# Patient Record
Sex: FEMALE | Race: WHITE | Employment: UNEMPLOYED | URBAN - METROPOLITAN AREA
[De-identification: names, ages, dates, MRNs, and addresses within clinical notes are randomized per-mention and may not be internally consistent; named-entity substitution may affect disease eponyms.]

---

## 2018-01-10 ENCOUNTER — OFFICE VISIT (OUTPATIENT)
Dept: SURGERY | Facility: CLINIC | Age: 33
End: 2018-01-10
Payer: COMMERCIAL

## 2018-01-10 VITALS
SYSTOLIC BLOOD PRESSURE: 101 MMHG | DIASTOLIC BLOOD PRESSURE: 55 MMHG | WEIGHT: 146.6 LBS | HEART RATE: 50 BPM | HEIGHT: 63 IN | OXYGEN SATURATION: 99 % | TEMPERATURE: 97.6 F | BODY MASS INDEX: 25.98 KG/M2

## 2018-01-10 DIAGNOSIS — R10.84 ABDOMINAL PAIN, GENERALIZED: Primary | ICD-10-CM

## 2018-01-10 ASSESSMENT — ENCOUNTER SYMPTOMS
NAUSEA: 1
HALLUCINATIONS: 0
DIARRHEA: 0
ALTERED TEMPERATURE REGULATION: 1
POLYPHAGIA: 0
FATIGUE: 1
INCREASED ENERGY: 1
NIGHT SWEATS: 1
VOMITING: 0
BLOATING: 1
BOWEL INCONTINENCE: 0
CHILLS: 1
BLOOD IN STOOL: 0
WEIGHT LOSS: 0
FEVER: 0
WEIGHT GAIN: 0
POLYDIPSIA: 1
RECTAL PAIN: 0
DECREASED APPETITE: 0
ABDOMINAL PAIN: 1
JAUNDICE: 0
CONSTIPATION: 0
HEARTBURN: 0

## 2018-01-10 ASSESSMENT — PAIN SCALES - GENERAL: PAINLEVEL: MILD PAIN (3)

## 2018-01-10 NOTE — MR AVS SNAPSHOT
After Visit Summary   1/10/2018    Tricia Nunez    MRN: [id number]           Patient Information     Date Of Birth          1985        Visit Information        Provider Department      1/10/2018 9:45 AM Araceli Jewell MD [facility] General Surgery        Today's Diagnoses     Abdominal pain, generalized    -  1       Follow-ups after your visit        Additional Services     PAIN MANAGEMENT REFERRAL       Your provider has referred you to: [facility]: [facility] [place] [phone] [email or web address]      Please call [phone] to make an appointment. Clinic is located: [facility] [address] [place] [address] [id number] 4th [place] [address]      Please complete the following questions:    Procedure/Referral: Referral Only -  Comprehensive Evaluation and Management    What is your diagnosis for the patient's pain? Chronic abdominal pain of uncertain etiology    What are your specific questions for the pain specialist? Patient needs pain control and management    Are there any red flags that may impact the assessment or management of the patient? Other: Patient has mental illness/bipolar disorder diagnosis in computer which she believe she got when she was in the hospital for methamphetamines about 5-6 years ago.  She is not taking any medications for this and denies current meth use.      Please note the Pre-Op Pain Consult must be scheduled 2-3 weeks prior to the patient's surgery.  Patient's trying to schedule within 2 weeks of surgery may not be accommodated.     Pre-Op Pain Consults are only good for 30 days.    **ANY DIAGNOSTIC TESTS THAT ARE NOT IN EPIC SHOULD BE SENT TO THE PAIN CENTER**    REGARDING OPIOID MEDICATIONS:  We will always address appropriateness of opioid pain medications, but we generally will not automatically take on a prescribing role. When we  do take on prescribing of opioids for chronic pain, it is in collaboration with the referring physician for an intermediate period of time (months), with an expectation that the primary physician or provider will assume the prescribing role if medications are effective at stable doses with demonstrated compliance.  Therefore, please do not assume that your prescribing responsibilities end on the day of pain clinic consultation.  Is this agreeable to you? I don't currently prescribe for her.  Please contact PCP to partner for prescriptions.    Please be aware that coverage of these services is subject to the terms and limitations of your health insurance plan.  Call member services at your health plan with any benefit or coverage questions.      Please bring the following with you to your appointment:    (1) Any X-Rays, CTs or MRIs which have been performed.  Contact the facility where they were done to arrange for  prior to your scheduled appointment.    (2) List of current medications   (3) This referral request   (4) Any documents/labs given to you for this referral                  Who to contact     Please call your clinic at [phone] to:    Ask questions about your health    Make or cancel appointments    Discuss your medicines    Learn about your test results    Speak to your doctor   If you have compliments or concerns about an experience at your clinic, or if you wish to file a complaint, please contact [facility] Patient Relations at [phone] or email us at [email or web address]         Additional Information About Your Visit        StyleTechhart Information     [employer] is an electronic gateway that provides easy, online access to your medical records. With [employer], you can request a clinic appointment, read your test results, renew a prescription or communicate with your care team.     To sign up for Wandou[employer] visit the website at [email or web address]  "  You will be asked to enter the access code listed below, as well as some personal information. Please follow the directions to create your username and password.     Your access code is: [email or web address]  Expires: 2018  6:30 AM     Your access code will  in 90 days. If you need help or a new code, please contact your [facility] or call [phone] for assistance.        Care EveryWhere ID     This is your Care EveryWhere ID. This could be used by other organizations to access your [place] medical records  [id number]        Your Vitals Were     Pulse Temperature Height Pulse Oximetry BMI (Body Mass Index)       50 97.6  F (36.4  C) (Oral) 5' 3\" 99% 25.97 kg/m2        Blood Pressure from Last 3 Encounters:   01/10/18 101/55   09/04/15 (!) 88/49   08/26/15 107/67    Weight from Last 3 Encounters:   01/10/18 146 lb 9.6 oz   09/04/15 145 lb 9.6 oz   08/26/15 141 lb 14.4 oz              We Performed the Following     PAIN MANAGEMENT REFERRAL        Primary Care Provider Office Phone # Fax #    Emma [place] [phone]       [facility] [id number] [place] [id number]        Equal Access to Services     FRANCISCO JAVIER MCCLOUD AH: Hadii aad ku hadasho Soomaali, waaxda luqadaha, qaybta kaalmada adeegyada, waxay idiin hayaan alejandro rice . So [facility] [phone].    ATENCIÓN: Si habla español, tiene a brunson disposición servicios gratuitos de asistencia lingüística. Llame al [phone].    We comply with applicable federal civil rights laws and [place] laws. We do not discriminate on the basis of race, color, national origin, age, disability, sex, sexual orientation, or gender identity.            Thank you!     Thank you for choosing [facility]  for your care. Our goal is always to provide you with excellent care. Hearing back from our patients is one way we can continue to improve our services. Please take a few minutes to " complete the written survey that you may receive in the mail after your visit with us. Thank you!             Your Updated Medication List - Protect others around you: Learn how to safely use, store and throw away your medicines at www.disposemymeds.org.          This list is accurate as of: 1/10/18 10:40 AM.  Always use your most recent med list.                   Brand Name Dispense Instructions for use Diagnosis    ADVAIR DISKUS 100-50 MCG/DOSE diskus inhaler   Generic drug:  fluticasone-salmeterol           * PROAIR  (90 BASE) MCG/ACT Inhaler   Generic drug:  albuterol           * albuterol (2.5 MG/3ML) 0.083% neb solution           dicyclomine 20 MG tablet    BENTYL          fluticasone 50 MCG/ACT spray    FLONASE          * Notice:  This list has 2 medication(s) that are the same as other medications prescribed for you. Read the directions carefully, and ask your doctor or other care provider to review them with you.

## 2018-01-10 NOTE — PROGRESS NOTES
"Surgery Clinic Outpatient Progress Note  January 10, 2018  Tricia Nunez  [id number]    S: Tricia Nunez is a 32 year old female who presents to the clinic in follow-up of chronic abdominal pain. The pain has been present now for about seven years.  She says this is getting worse.  She says it is pretty much all day long.  She underwent hysterectomy in June to try to make this better, but it did not make any difference, better or worse.  She denies any alleviating or exacerbating factors such as eating, movement or sleep.  The patient is eating \"good enough\" and she denies any bowel complaints such as nausea, constipation or diarrhea.     ROS: Answers for HPI/ROS submitted by the patient on 1/10/2018   General Symptoms: Yes  Skin Symptoms: No  HENT Symptoms: No  EYE SYMPTOMS: No  HEART SYMPTOMS: No  LUNG SYMPTOMS: No  INTESTINAL SYMPTOMS: Yes  URINARY SYMPTOMS: No  GYNECOLOGIC SYMPTOMS: No  BREAST SYMPTOMS: No  SKELETAL SYMPTOMS: No  BLOOD SYMPTOMS: No  NERVOUS SYSTEM SYMPTOMS: No  MENTAL HEALTH SYMPTOMS: No  Fever: No  Loss of appetite: No  Weight loss: No  Weight gain: No  Fatigue: Yes  Night sweats: Yes  Chills: Yes  Increased stress: No  Excessive hunger: No  Excessive thirst: Yes  Feeling hot or cold when others believe the temperature is normal: Yes  Loss of height: No  Post-operative complications: No  Surgical site pain: No  Hallucinations: No  Change in or Loss of Energy: Yes  Hyperactivity: No  Confusion: No  Heart burn or indigestion: No  Nausea: Yes  Vomiting: No  Abdominal pain: Yes  Bloating: Yes  Constipation: No  Diarrhea: No  Blood in stool: No  Black stools: No  Rectal or Anal pain: No  Fecal incontinence: No  Yellowing of skin or eyes: No  Vomit with blood: No  Change in stools: No    O:  146 lbs 9.6 oz   Temp:  [97.6  F (36.4  C)] 97.6  F (36.4  C)  Pulse:  [50] 50  BP: (101)/(55) 101/55  SpO2:  [99 %] 99 %  Drain: The patient does not have any drains or tubes we are monitoring.  Physical Exam:  Gen: " "Alert, oriented, no distress  Psych: Normal affect  Neuro: No focal deficit  Resp: Quiet breathing  CV: Warm and well perfused.  Abd: Soft mild epigastric tenderness  Ext: No obvious deformities    A/P:   Tricia Nunez is a 32 year old female with chronic abdominal pain.  Previous work-up including CT and CT enterography have shown no surgical pathology.  I recommended referral to GI for possible increase in omeprazole and adding a second agent.  She mentioned that she had tried everything and at this point interrupts to ask if I would \"start a pain contract with her.\"  I think she is looking for medical pain management with narcotics .  I will refer her to the pain clinic, but I think they will prefer to work with a PCP in her hometown as well.  At this point she tells me that her PCP won't give her narcotics and they wanted to start her on suboxone.     Total time 25 mins, the majority of which was spent in counseling.  Araceli Jewell MD FACS  Associate Professor of Surgery  Pager [phone]         "

## 2018-01-10 NOTE — NURSING NOTE
"Chief Complaint   Patient presents with     Clinic Care Coordination - Follow-up     return       Vitals:    01/10/18 0925   BP: 101/55   Pulse: 50   Temp: 97.6  F (36.4  C)   TempSrc: Oral   SpO2: 99%   Weight: 146 lb 9.6 oz   Height: 5' 3\"       Body mass index is 25.97 kg/(m^2).    Vera JHAVERI LPN                       "

## 2018-01-10 NOTE — LETTER
"1/10/2018       RE: Tricia Nunez  [address] [place] [address]     Dear Colleague,    Thank you for referring your patient, Tricia Nunez, to the [facility] GENERAL SURGERY at [facility]. Please see a copy of my visit note below.    Surgery Clinic Outpatient Progress Note  January 10, 2018  Tricia Nunez  [id number]    S: Tricia Nunez is a 32 year old female who presents to the clinic in follow-up of chronic abdominal pain. The pain has been present now for about seven years.  She says this is getting worse.  She says it is pretty much all day long.  She underwent hysterectomy in June to try to make this better, but it did not make any difference, better or worse.  She denies any alleviating or exacerbating factors such as eating, movement or sleep.  The patient is eating \"good enough\" and she denies any bowel complaints such as nausea, constipation or diarrhea.     ROS:    O:  146 lbs 9.6 oz   Temp:  [97.6  F (36.4  C)] 97.6  F (36.4  C)  Pulse:  [50] 50  BP: (101)/(55) 101/55  SpO2:  [99 %] 99 %  Drain: The patient does not have any drains or tubes we are monitoring.  Physical Exam:  Gen: Alert, oriented, no distress  Psych: Normal affect  Neuro: No focal deficit  Resp: Quiet breathing  CV: Warm and well perfused.  Abd: Soft mild epigastric tenderness  Ext: No obvious deformities    A/P:   Tricia Nunez is a 32 year old female with chronic abdominal pain.  Previous work-up including CT and CT enterography have shown no surgical pathology.  I recommended referral to GI for possible increase in omeprazole and adding a second agent.  She mentioned that she had tried everything and at this point interrupts to ask if I would \"start a pain contract with her.\"  I think she is looking for medical pain management with narcotics .  I will refer her to the pain clinic, but I think they will prefer to work with a PCP in her hometown as well.  At this point she tells me that her PCP won't give her " narcotics and they wanted to start her on suboxone.     Total time 25 mins, the majority of which was spent in counseling.  Araceli Jewell MD FACS  Associate Professor of Surgery  Pager [phone]           Again, thank you for allowing me to participate in the care of your patient.      Sincerely,    Araceli Jewell MD

## 2018-01-11 NOTE — TELEPHONE ENCOUNTER
APPT INFO    Date /Time: 1/16/18 9:40AM    Reason for Appt: new abdominal pain   Ref Provider/Clinic: [facility]   Are there internal records? Yes/No?  IF YES, list clinic names: [facility] General Surgery [facility] (referring) / Images in PACS   Colonoscopy 9/12/14 & Upper GI 9/12/14   Are there outside records? Yes/No? Yes    Patient Contact (Y/N) & Call Details: No, pt was referred.      Action: CareEverywhere records reviewed. See CareEverywhere Tab.  Faxed cover sheet to [facility] to re. Images pushed      OUTSIDE RECORDS CHECKLIST     CLINIC NAME COMMENTS REC (x) IMG (x)   [facility] (Care-Everywhere) OFFICE NOTES: 10/20/17, 10/4/17, 9/21/17, 9/8/17, 8/1/17, 5/17/17  RADIOLOGY:   CT abd pelvis 8/27/17, 7/27/17, 5/18/17   US Pelvis 3/3/17  ER/HOSP: 9/17/17, 8/27/17, 7/27/17   OP NOTES: TOTAL LAPAROSCOPIC HYSTERECTOMY; BILATERAL SALPINGOOPHORECTOMY 7/24/17  OTHER:  5/18/17  X

## 2018-01-15 NOTE — TELEPHONE ENCOUNTER
Phone Call:    Who did you talk to? (or) Who did you call? Chance Antoine, spoke with Kate Nath Detail/Action: Kate states pt images were not done at [facility], they were done at [facility].     Faxed cover sheet to [facility] to mail CD ASAP

## 2018-01-16 ENCOUNTER — PRE VISIT (OUTPATIENT)
Dept: ANESTHESIOLOGY | Facility: CLINIC | Age: 33
End: 2018-01-16

## 2018-01-16 NOTE — TELEPHONE ENCOUNTER
Phone Call:    Who did you talk to? (or) Who did you call? [facility] called and LM    Call Detail/Action: They will mail out CD since it is passed due to overnight their CD. I will send the CD to the Film room once I receive it.

## 2018-01-16 NOTE — TELEPHONE ENCOUNTER
[facility]    Image Type (x): Disc:_x__  Pacs:___      Exam Date/Name: CT Abd/Pelvis 8/27/17, 7/27/17, 5/18/17    US Pelvis 3/3/17 Comments: Disc sent to [facility]

## 2018-02-16 ENCOUNTER — RADIANT APPOINTMENT (OUTPATIENT)
Dept: GENERAL RADIOLOGY | Facility: CLINIC | Age: 33
End: 2018-02-16
Attending: PHYSICIAN ASSISTANT
Payer: COMMERCIAL

## 2018-02-16 ENCOUNTER — OFFICE VISIT (OUTPATIENT)
Dept: GASTROENTEROLOGY | Facility: CLINIC | Age: 33
End: 2018-02-16
Payer: COMMERCIAL

## 2018-02-16 VITALS
BODY MASS INDEX: 25.76 KG/M2 | TEMPERATURE: 97.6 F | OXYGEN SATURATION: 100 % | WEIGHT: 145.4 LBS | DIASTOLIC BLOOD PRESSURE: 74 MMHG | HEART RATE: 56 BPM | SYSTOLIC BLOOD PRESSURE: 114 MMHG | HEIGHT: 63 IN

## 2018-02-16 DIAGNOSIS — R10.84 ABDOMINAL PAIN, GENERALIZED: ICD-10-CM

## 2018-02-16 DIAGNOSIS — R10.84 ABDOMINAL PAIN, GENERALIZED: Primary | ICD-10-CM

## 2018-02-16 LAB
ALBUMIN SERPL-MCNC: 4.1 G/DL (ref 3.4–5)
ALP SERPL-CCNC: 98 U/L (ref 40–150)
ALT SERPL W P-5'-P-CCNC: 17 U/L (ref 0–50)
ANION GAP SERPL CALCULATED.3IONS-SCNC: 5 MMOL/L (ref 3–14)
AST SERPL W P-5'-P-CCNC: 15 U/L (ref 0–45)
BASOPHILS # BLD AUTO: 0.1 10E9/L (ref 0–0.2)
BASOPHILS NFR BLD AUTO: 0.8 %
BILIRUB DIRECT SERPL-MCNC: 0.2 MG/DL (ref 0–0.2)
BILIRUB SERPL-MCNC: 0.6 MG/DL (ref 0.2–1.3)
BUN SERPL-MCNC: 10 MG/DL (ref 7–30)
CALCIUM SERPL-MCNC: 9.3 MG/DL (ref 8.5–10.1)
CHLORIDE SERPL-SCNC: 102 MMOL/L (ref 94–109)
CO2 SERPL-SCNC: 31 MMOL/L (ref 20–32)
CREAT SERPL-MCNC: 0.71 MG/DL (ref 0.52–1.04)
CRP SERPL-MCNC: <2.9 MG/L (ref 0–8)
DIFFERENTIAL METHOD BLD: NORMAL
EOSINOPHIL # BLD AUTO: 0.5 10E9/L (ref 0–0.7)
EOSINOPHIL NFR BLD AUTO: 8.2 %
ERYTHROCYTE [DISTWIDTH] IN BLOOD BY AUTOMATED COUNT: 11.7 % (ref 10–15)
ERYTHROCYTE [SEDIMENTATION RATE] IN BLOOD BY WESTERGREN METHOD: 5 MM/H (ref 0–20)
GFR SERPL CREATININE-BSD FRML MDRD: >90 ML/MIN/1.7M2
GLUCOSE SERPL-MCNC: 88 MG/DL (ref 70–99)
HCT VFR BLD AUTO: 42.5 % (ref 35–47)
HGB BLD-MCNC: 14 G/DL (ref 11.7–15.7)
IMM GRANULOCYTES # BLD: 0 10E9/L (ref 0–0.4)
IMM GRANULOCYTES NFR BLD: 0.2 %
LYMPHOCYTES # BLD AUTO: 2.3 10E9/L (ref 0.8–5.3)
LYMPHOCYTES NFR BLD AUTO: 37.8 %
MCH RBC QN AUTO: 31.3 PG (ref 26.5–33)
MCHC RBC AUTO-ENTMCNC: 32.9 G/DL (ref 31.5–36.5)
MCV RBC AUTO: 95 FL (ref 78–100)
MONOCYTES # BLD AUTO: 0.4 10E9/L (ref 0–1.3)
MONOCYTES NFR BLD AUTO: 7.2 %
NEUTROPHILS # BLD AUTO: 2.8 10E9/L (ref 1.6–8.3)
NEUTROPHILS NFR BLD AUTO: 45.8 %
NRBC # BLD AUTO: 0 10*3/UL
NRBC BLD AUTO-RTO: 0 /100
PLATELET # BLD AUTO: 267 10E9/L (ref 150–450)
POTASSIUM SERPL-SCNC: 3.5 MMOL/L (ref 3.4–5.3)
PROT SERPL-MCNC: 7.4 G/DL (ref 6.8–8.8)
RBC # BLD AUTO: 4.47 10E12/L (ref 3.8–5.2)
SODIUM SERPL-SCNC: 138 MMOL/L (ref 133–144)
WBC # BLD AUTO: 6 10E9/L (ref 4–11)

## 2018-02-16 RX ORDER — FLUOXETINE 40 MG/1
40 CAPSULE ORAL
COMMUNITY
Start: 2018-02-06 | End: 2018-11-05

## 2018-02-16 RX ORDER — DOXEPIN HYDROCHLORIDE 25 MG/1
25 CAPSULE ORAL
COMMUNITY
Start: 2018-02-06

## 2018-02-16 RX ORDER — BUSPIRONE HYDROCHLORIDE 30 MG/1
30 TABLET ORAL
COMMUNITY
Start: 2018-02-06 | End: 2018-11-05

## 2018-02-16 RX ORDER — ROPINIROLE 0.5 MG/1
0.5 TABLET, FILM COATED ORAL
COMMUNITY
Start: 2018-02-06

## 2018-02-16 ASSESSMENT — PAIN SCALES - GENERAL: PAINLEVEL: MILD PAIN (2)

## 2018-02-16 NOTE — NURSING NOTE
"Chief Complaint   Patient presents with     RECHECK     F/U       Vitals:    02/16/18 0807   BP: 114/74   BP Location: Left arm   Patient Position: Chair   Cuff Size: Adult Regular   Pulse: 56   Temp: 97.6  F (36.4  C)   SpO2: 100%   Weight: 145 lb 6.4 oz   Height: 5' 3\"       Body mass index is 25.76 kg/(m^2).      Odessa Reed                          "

## 2018-02-16 NOTE — PATIENT INSTRUCTIONS
It was a pleasure taking care of you today.  I've included a brief summary of our discussion and care plan from today's visit below.  Please review this information with your primary care provider.  ______________________________________________________________________    My recommendations are summarized as follows:    -- Labs today  -- Stool sample when able  -- Recommend a trial of Miralax.  This is not a stimulant laxative and is safe to take on a daily basis.  Try 2 cap(s) every day.  You can titrate this dose (increase or decrease) based on stool frequency and consistency.  -- Recommend soluble fiber supplementation on a daily basis (Metamucil, citrucel or benefiber).  Start with 1 tablespoon per day and if tolerated, may increase up to 2-3 tablespoons per day.  You may experience some bloating with initiation of fiber supplementation that will improve over the first month.  A good fiber trial to evaluate the effect is 3-6 months.     ______________________________________________________________________    Who do I call with any questions after my visit?  Please be in touch if there are any further questions that arise following today's visit.  There are multiple ways to contact your gastroenterology care team.        During business hours, you may reach a Gastroenterology nurse at [phone], option 3.       To schedule or reschedule an appointment, please call [phone].       You can always send a secure message through [employer].  [employer] messages are answered by your nurse or doctor typically within 24 hours.  Please allow extra time on weekends and holidays.        For urgent/emergent questions after business hours, you may reach the on-call GI Fellow by contacting the [facility] at [phone].     How will I get the results of any tests ordered?    You will receive all of your results.  If you have signed up for MyChart, any tests ordered at your visit will be available to you  after your physician reviews them.  Typically this takes 1-2 weeks.  If there are urgent results that require a change in your care plan, your physician or nurse will call you to discuss the next steps.      What is [employer]iv[employer]?  [employer] is a secure way for you to access all of your healthcare records from the [facility].  It is a web based computer program, so you can sign on to it from any location.  It also allows you to send secure messages to your care team.  I recommend signing up for [employer] access if you have not already done so and are comfortable with using a computer.      How to I schedule a follow-up visit?  If you did not schedule a follow-up visit today, please call [phone] to schedule a follow-up office visit.        Sincerely,    Jose Alejandro Beltran PA-C  [facility]  Division of Gastroenterology
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
## 2018-11-05 ENCOUNTER — OFFICE VISIT (OUTPATIENT)
Dept: SURGERY | Facility: CLINIC | Age: 33
End: 2018-11-05
Payer: COMMERCIAL

## 2018-11-05 VITALS
DIASTOLIC BLOOD PRESSURE: 63 MMHG | HEART RATE: 84 BPM | HEIGHT: 63 IN | TEMPERATURE: 97.5 F | SYSTOLIC BLOOD PRESSURE: 100 MMHG | WEIGHT: 157 LBS | BODY MASS INDEX: 27.82 KG/M2

## 2018-11-05 DIAGNOSIS — R10.84 GENERALIZED ABDOMINAL PAIN: Primary | ICD-10-CM

## 2018-11-05 PROCEDURE — 99204 OFFICE O/P NEW MOD 45 MIN: CPT | Performed by: SURGERY

## 2018-11-05 ASSESSMENT — PAIN SCALES - GENERAL: PAINLEVEL: MODERATE PAIN (4)
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
## 2018-11-05 NOTE — NURSING NOTE
"Initial /63 (BP Location: Right arm, Patient Position: Sitting, Cuff Size: Adult Regular)  Pulse 84  Temp 97.5  F (36.4  C) (Tympanic)  Ht 1.6 m (5' 3\")  Wt 71.2 kg (157 lb)  BMI 27.81 kg/m2 Estimated body mass index is 27.81 kg/(m^2) as calculated from the following:    Height as of this encounter: 1.6 m (5' 3\").    Weight as of this encounter: 71.2 kg (157 lb). .    Elo Cardenas CMA    "
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
## 2022-01-10 ENCOUNTER — HOSPITAL ENCOUNTER (EMERGENCY)
Facility: CLINIC | Age: 37
Discharge: HOME | End: 2022-01-10
Attending: PHYSICIAN ASSISTANT | Admitting: PHYSICIAN ASSISTANT
Payer: MEDICAID

## 2022-01-10 ENCOUNTER — NURSE TRIAGE (OUTPATIENT)
Dept: NURSING | Facility: CLINIC | Age: 37
End: 2022-01-10
Payer: MEDICAID

## 2022-01-10 VITALS
HEART RATE: 102 BPM | WEIGHT: 140 LBS | HEIGHT: 63 IN | RESPIRATION RATE: 18 BRPM | BODY MASS INDEX: 24.8 KG/M2 | SYSTOLIC BLOOD PRESSURE: 113 MMHG | TEMPERATURE: 97.4 F | DIASTOLIC BLOOD PRESSURE: 67 MMHG | OXYGEN SATURATION: 97 %

## 2022-01-10 DIAGNOSIS — M46.1 SACROILIITIS (H): ICD-10-CM

## 2022-01-10 DIAGNOSIS — G89.29 CHRONIC BILATERAL LOW BACK PAIN WITH BILATERAL SCIATICA: ICD-10-CM

## 2022-01-10 DIAGNOSIS — M54.41 CHRONIC BILATERAL LOW BACK PAIN WITH BILATERAL SCIATICA: ICD-10-CM

## 2022-01-10 DIAGNOSIS — M54.42 CHRONIC BILATERAL LOW BACK PAIN WITH BILATERAL SCIATICA: ICD-10-CM

## 2022-01-10 PROBLEM — R10.33 PERIUMBILICAL ABDOMINAL PAIN: Status: ACTIVE | Noted: 2018-10-23

## 2022-01-10 PROBLEM — R10.2 PELVIC PAIN: Status: ACTIVE | Noted: 2022-01-10

## 2022-01-10 PROBLEM — R07.89 ATYPICAL CHEST PAIN: Status: ACTIVE | Noted: 2019-03-21

## 2022-01-10 PROBLEM — R53.82 CHRONIC FATIGUE: Status: ACTIVE | Noted: 2018-01-25

## 2022-01-10 PROBLEM — F51.04 PSYCHOPHYSIOLOGICAL INSOMNIA: Status: ACTIVE | Noted: 2020-02-20

## 2022-01-10 PROBLEM — R00.1 BRADYCARDIA: Status: ACTIVE | Noted: 2019-03-21

## 2022-01-10 PROBLEM — J45.40 MODERATE PERSISTENT ASTHMA: Status: ACTIVE | Noted: 2018-02-06

## 2022-01-10 PROBLEM — Z86.19 HISTORY OF HEPATITIS C: Status: ACTIVE | Noted: 2018-01-25

## 2022-01-10 PROCEDURE — 96372 THER/PROPH/DIAG INJ SC/IM: CPT | Performed by: PHYSICIAN ASSISTANT

## 2022-01-10 PROCEDURE — 99204 OFFICE O/P NEW MOD 45 MIN: CPT | Performed by: PHYSICIAN ASSISTANT

## 2022-01-10 PROCEDURE — 250N000011 HC RX IP 250 OP 636: Performed by: PHYSICIAN ASSISTANT

## 2022-01-10 PROCEDURE — G0463 HOSPITAL OUTPT CLINIC VISIT: HCPCS | Performed by: PHYSICIAN ASSISTANT

## 2022-01-10 RX ORDER — HYDROMORPHONE HYDROCHLORIDE 1 MG/ML
0.5 INJECTION, SOLUTION INTRAMUSCULAR; INTRAVENOUS; SUBCUTANEOUS
Status: DISCONTINUED | OUTPATIENT
Start: 2022-01-10 | End: 2022-01-10

## 2022-01-10 RX ORDER — HYDROMORPHONE HYDROCHLORIDE 1 MG/ML
0.5 INJECTION, SOLUTION INTRAMUSCULAR; INTRAVENOUS; SUBCUTANEOUS
Status: COMPLETED | OUTPATIENT
Start: 2022-01-10 | End: 2022-01-10

## 2022-01-10 RX ORDER — KETOROLAC TROMETHAMINE 30 MG/ML
30 INJECTION, SOLUTION INTRAMUSCULAR; INTRAVENOUS ONCE
Status: COMPLETED | OUTPATIENT
Start: 2022-01-10 | End: 2022-01-10

## 2022-01-10 RX ADMIN — KETOROLAC TROMETHAMINE 30 MG: 30 INJECTION, SOLUTION INTRAMUSCULAR; INTRAVENOUS at 15:56

## 2022-01-10 RX ADMIN — HYDROMORPHONE HYDROCHLORIDE 0.5 MG: 1 INJECTION, SOLUTION INTRAMUSCULAR; INTRAVENOUS; SUBCUTANEOUS at 16:07

## 2022-01-10 ASSESSMENT — ENCOUNTER SYMPTOMS
BACK PAIN: 1
CONSTITUTIONAL NEGATIVE: 1
NEUROLOGICAL NEGATIVE: 1
GASTROINTESTINAL NEGATIVE: 1

## 2022-01-10 ASSESSMENT — MIFFLIN-ST. JEOR: SCORE: 1294.17
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
## 2022-01-10 NOTE — TELEPHONE ENCOUNTER
Patient calling today stating she's been in excruciating pain since last night. Patient has and SI joint fusion in July and other side 7 weeks ago and is rating her lower back pain 9-10/10.    Patient has taken the maximum amount of Tylenol and Aleve, using heat and Ice, and resting with no relief.     Patient states her stomach is upset from all the pain medication. Advised patient take medication with food.    Per protocol, recommendations are for patient to See Today in Office. Advised urgent care. Care advice given. Advised patient to call back if they develop any new or worsening symptoms. Patient verbalizes understanding and agrees with plan of care.    Chasity Duke RN  01/10/22 2:04 PM  [facility] Nurse Advisor    Reason for Disposition    SEVERE back pain (e.g., excruciating, unable to do any normal activities) and not improved after pain medicine and CARE ADVICE    Additional Information    Negative: Passed out (i.e., fainted, collapsed and was not responding)    Negative: Shock suspected (e.g., cold/pale/clammy skin, too weak to stand, low BP, rapid pulse)    Negative: Sounds like a life-threatening emergency to the triager    Negative: Major injury to the back (e.g., MVA, fall > 10 feet or 3 meters, penetrating injury, etc.)    Negative: Pain in the upper back over the ribs (rib cage) that radiates (travels) into the chest    Negative: Pain in the upper back over the ribs (rib cage) and worsened by coughing (or clearly increases with breathing)    Negative: SEVERE back pain of sudden onset and age > 60    Negative: SEVERE abdominal pain (e.g., excruciating)    Negative: Abdominal pain and age > 60    Negative: Unable to urinate (or only a few drops) and bladder feels very full    Negative: Loss of bladder or bowel control (urine or bowel incontinence; wetting self, leaking stool) of new onset    Negative: Numbness (loss of sensation) in groin or rectal area    Negative: Pain radiates into groin,  scrotum    Negative: Blood in urine (red, pink, or tea-colored)    Negative: Vomiting and pain over lower ribs of back (i.e., flank - kidney area)    Negative: Weakness of a leg or foot (e.g., unable to bear weight, dragging foot)    Negative: Patient sounds very sick or weak to the triager    Negative: Fever > 100.4 F (38.0 C) and flank pain    Negative: Pain or burning with passing urine (urination)    Protocols used: BACK PAIN-A-OH    COVID 19 Nurse Triage Plan/Patient Instructions    Please be aware that novel coronavirus (COVID-19) may be circulating in the community. If you develop symptoms such as fever, cough, or SOB or if you have concerns about the presence of another infection including coronavirus (COVID-19), please contact your health care provider or visit [email or web address].     Disposition/Instructions    In-Person Visit with provider recommended. Reference Visit Selection Guide.    Thank you for taking steps to prevent the spread of this virus.  o Limit your contact with others.  o Wear a simple mask to cover your cough.  o Wash your hands well and often.    Resources    [facility]: About COVID-19: [email or web address]    CDC: What to Do If You're Sick: www.cdc.gov/coronavirus/2019-ncov/about/steps-when-sick.html    CDC: Ending Home Isolation: www.cdc.gov/coronavirus/2019-ncov/hcp/disposition-in-home-patients.html     CDC: Caring for Someone: www.cdc.gov/coronavirus/2019-ncov/if-you-are-sick/care-for-someone.html     [facility]: Interim Guidance for Hospital Discharge to Home: www.[email or web address].[place]/diseases/coronavirus/hcp/hospdischarge.pdf    [facility] clinical trials (COVID-19 research studies): [email or web address]     Below are the COVID-19 hotlines at the [place] Department [facility] ([facility]). Interpreters are available.   o For health questions: Call 964-451-8888 or 1-661.634.4130 (7 a.m. to 7 p.m.)  o For questions about schools and  childcare: Call 068-868-5303 or 1-173.723.8566 (7 a.m. to 7 p.m.)

## 2022-08-21 NOTE — MR AVS SNAPSHOT
"              After Visit Summary   11/5/2018    Tricia Nunez    MRN: [id number]           Patient Information     Date Of Birth          1985        Visit Information        Provider Department      11/5/2018 10:45 AM Regan Gabmoa MD [facility]        Today's Diagnoses     Generalized abdominal pain    -  1      Care Instructions    Per Physician's instructions            Follow-ups after your visit        Future tests that were ordered for you today     Open Future Orders        Priority Expected Expires Ordered    US Abdomen Complete Routine  11/5/2019 11/5/2018            Who to contact     If you have questions or need follow up information about today's clinic visit or your schedule please contact [facility] directly at [phone].  Normal or non-critical lab and imaging results will be communicated to you by MyChart, letter or phone within 4 business days after the clinic has received the results. If you do not hear from us within 7 days, please contact the clinic through MyChart or phone. If you have a critical or abnormal lab result, we will notify you by phone as soon as possible.  Submit refill requests through [employer] or call your pharmacy and they will forward the refill request to us. Please allow 3 business days for your refill to be completed.          Additional Information About Your Visit        Care EveryWhere ID     This is your Care EveryWhere ID. This could be used by other organizations to access your [place] medical records  [id number]        Your Vitals Were     Pulse Temperature Height BMI (Body Mass Index)          84 97.5  F (36.4  C) (Tympanic) 1.6 m (5' 3\") 27.81 kg/m2         Blood Pressure from Last 3 Encounters:   11/05/18 100/63   02/16/18 114/74   01/10/18 101/55    Weight from Last 3 Encounters:   11/05/18 71.2 kg (157 lb)   02/16/18 66 kg (145 lb 6.4 oz)   01/10/18 66.5 kg (146 lb 9.6 oz)                 Today's Medication Changes        " intoxication   These changes are accurate as of 11/5/18 12:29 PM.  If you have any questions, ask your nurse or doctor.               Stop taking these medicines if you haven't already. Please contact your care team if you have questions.     ADVAIR DISKUS 100-50 MCG/DOSE diskus inhaler   Generic drug:  fluticasone-salmeterol   Stopped by:  Regan Gamboa MD           BusPIRone HCl 30 MG Tabs   Stopped by:  Regan Gamboa MD           dicyclomine 20 MG tablet   Commonly known as:  BENTYL   Stopped by:  Regan Gamboa MD           FLUoxetine 40 MG capsule   Commonly known as:  PROzac   Stopped by:  Regan Gamboa MD           fluticasone 50 MCG/ACT spray   Commonly known as:  FLONASE   Stopped by:  Regan Gamboa MD                    Primary Care Provider Office Phone # Fax #    Emma Berumen [phone]       [facility] [address] [facility] [address]        Equal Access to Services     [facility]: Hadii aad ku hadasho Soomaali, waaxda luqadaha, qaybta kaalmada adeegyada, waxay idiin hayaan adeeg deon rice . So [facility] [phone].    ATENCIÓN: Si habla español, tiene a brunson disposición servicios gratuitos de asistencia lingüística. Llame al [phone].    We comply with applicable federal civil rights laws and [place] laws. We do not discriminate on the basis of race, color, national origin, age, disability, sex, sexual orientation, or gender identity.            Thank you!     Thank you for choosing [facility]  for your care. Our goal is always to provide you with excellent care. Hearing back from our patients is one way we can continue to improve our services. Please take a few minutes to complete the written survey that you may receive in the mail after your visit with us. Thank you!             Your Updated Medication List - Protect others around you: Learn how to safely use, store and throw away your medicines at www.disposemymeds.org.           This list is accurate as of 11/5/18 12:29 PM.  Always use your most recent med list.                   Brand Name Dispense Instructions for use Diagnosis    * PROAIR  (90 Base) MCG/ACT inhaler   Generic drug:  albuterol           * albuterol (2.5 MG/3ML) 0.083% neb solution           doxepin 25 MG capsule    SINEquan     Take 25 mg by mouth        rOPINIRole 0.5 MG tablet    REQUIP     Take 0.5 mg by mouth        * Notice:  This list has 2 medication(s) that are the same as other medications prescribed for you. Read the directions carefully, and ask your doctor or other care provider to review them with you.

## 2024-10-08 NOTE — MR AVS SNAPSHOT
After Visit Summary   2/16/2018    Tricia Nunez    MRN: [id number]           Patient Information     Date Of Birth          1985        Visit Information        Provider Department      2/16/2018 8:20 AM Jose Alejandro Beltran PA-C M [facility]        Today's Diagnoses     Abdominal pain, generalized    -  1      Care Instructions    It was a pleasure taking care of you today.  I've included a brief summary of our discussion and care plan from today's visit below.  Please review this information with your primary care provider.  ______________________________________________________________________    My recommendations are summarized as follows:    -- Labs today  -- Stool sample when able  -- Recommend a trial of Miralax.  This is not a stimulant laxative and is safe to take on a daily basis.  Try 2 cap(s) every day.  You can titrate this dose (increase or decrease) based on stool frequency and consistency.  -- Recommend soluble fiber supplementation on a daily basis (Metamucil, citrucel or benefiber).  Start with 1 tablespoon per day and if tolerated, may increase up to 2-3 tablespoons per day.  You may experience some bloating with initiation of fiber supplementation that will improve over the first month.  A good fiber trial to evaluate the effect is 3-6 months.     ______________________________________________________________________    Who do I call with any questions after my visit?  Please be in touch if there are any further questions that arise following today's visit.  There are multiple ways to contact your gastroenterology care team.        During business hours, you may reach a Gastroenterology nurse at [phone], option 3.       To schedule or reschedule an appointment, please call [phone].       You can always send a secure message through [employer].  MyChart messages are answered by your nurse or doctor typically within 24 hours.  Please allow extra  Detail Level: Zone time on weekends and holidays.        For urgent/emergent questions after business hours, you may reach the on-call GI Fellow by contacting the [facility]  at [phone].     How will I get the results of any tests ordered?    You will receive all of your results.  If you have signed up for [employer], any tests ordered at your visit will be available to you after your physician reviews them.  Typically this takes 1-2 weeks.  If there are urgent results that require a change in your care plan, your physician or nurse will call you to discuss the next steps.      What is Pulse 8hart?  [employer] is a secure way for you to access all of your healthcare records from the [facility].  It is a web based computer program, so you can sign on to it from any location.  It also allows you to send secure messages to your care team.  I recommend signing up for [employer] access if you have not already done so and are comfortable with using a computer.      How to I schedule a follow-up visit?  If you did not schedule a follow-up visit today, please call [phone] to schedule a follow-up office visit.        Sincerely,    Jose Alejandro Beltran PA-C  [facility]  Division of Gastroenterology                Follow-ups after your visit        Follow-up notes from your care team     Return if symptoms worsen or fail to improve.      Future tests that were ordered for you today     Open Future Orders        Priority Expected Expires Ordered    Basic metabolic panel [LAB15] Routine 2/16/2018 4/17/2018 2/16/2018    Clostridium difficile toxin B PCR [MZC7322] Routine 2/16/2018 4/17/2018 2/16/2018    Ova and Parasite Exam Routine [AXE9349] Routine 2/16/2018 4/17/2018 2/16/2018    Giardia antigen [SBZ285] Routine 2/16/2018 4/17/2018 2/16/2018    Enteric Bacteria and Virus Panel by DON Stool [BDU7307] Routine 2/16/2018 4/17/2018 2/16/2018    Calprotectin Feces [YAC8795] Routine 2/16/2018 4/17/2018 2/16/2018     "X-ray Abdomen 2 vw Routine 2018    US Abdomen Limited Routine  2019    CBC with platelets differential [OBF670] Routine 2018    Hepatic panel [LAB20] Routine 2018    CRP inflammation [BTC5264] Routine 2018    Erythrocyte sedimentation rate auto [FST663] Routine 2018            Who to contact     Please call your clinic at [phone] to:    Ask questions about your health    Make or cancel appointments    Discuss your medicines    Learn about your test results    Speak to your doctor            Additional Information About Your Visit        [employer] Information     [employer] is an electronic gateway that provides easy, online access to your medical records. With [employer], you can request a clinic appointment, read your test results, renew a prescription or communicate with your care team.     To sign up for [employer] visit the website at [email or web address]   You will be asked to enter the access code listed below, as well as some personal information. Please follow the directions to create your username and password.     Your access code is: [email or web address]  Expires: 2018  6:30 AM     Your access code will  in 90 days. If you need help or a new code, please contact your [facility] or call [phone] for assistance.        Care EveryWhere ID     This is your Care EveryWhere ID. This could be used by other organizations to access your [place] medical records  [id number]        Your Vitals Were     Pulse Temperature Height Pulse Oximetry BMI (Body Mass Index)       56 97.6  F (36.4  C) 5' 3\" 100% 25.76 kg/m2        Blood Pressure from Last 3 Encounters:   18 114/74   01/10/18 101/55   09/04/15 (!) 88/49    Weight from Last 3 Encounters:   18 145 lb 6.4 oz   01/10/18 146 lb 9.6 oz   09/04/15 145 lb 9.6 oz               " Length Of Therapy: 1.5 years Add High Risk Medication Management Associated Diagnosis?: No Primary Care Provider Office Phone # Fax #    Emma Berumen [phone]       [facility] [address] [facility] [address]        Equal Access to Services     KULDIPJHONNY AME : Hadii tamica ku hadserao Soomaali, waaxda luqadaha, qaybta kaalmada adeegyada, waxsophie moultonxiomy yee. So [facility] [phone].    ATENCIÓN: Si habla español, tiene a brunson disposición servicios gratuitos de asistencia lingüística. Ll[facility] [phone].    We comply with applicable federal civil rights laws and [place] laws. We do not discriminate on the basis of race, color, national origin, age, disability, sex, sexual orientation, or gender identity.            Thank you!     Thank you for choosing [facility]  for your care. Our goal is always to provide you with excellent care. Hearing back from our patients is one way we can continue to improve our services. Please take a few minutes to complete the written survey that you may receive in the mail after your visit with us. Thank you!             Your Updated Medication List - Protect others around you: Learn how to safely use, store and throw away your medicines at www.disposemymeds.org.          This list is accurate as of 2/16/18  8:52 AM.  Always use your most recent med list.                   Brand Name Dispense Instructions for use Diagnosis    ADVAIR DISKUS 100-50 MCG/DOSE diskus inhaler   Generic drug:  fluticasone-salmeterol           * PROAIR  (90 BASE) MCG/ACT Inhaler   Generic drug:  albuterol           * albuterol (2.5 MG/3ML) 0.083% neb solution           BusPIRone HCl 30 MG Tabs      Take 30 mg by mouth        dicyclomine 20 MG tablet    BENTYL          doxepin 25 MG capsule    SINEquan     Take 25 mg by mouth        FLUoxetine 40 MG capsule    PROzac     Take 40 mg by mouth        fluticasone 50 MCG/ACT spray    FLONASE          rOPINIRole 0.5 MG tablet    REQUIP     Take 0.5 mg by  Patient Reported Weight(Optional But Include Units): 258 mouth        * Notice:  This list has 2 medication(s) that are the same as other medications prescribed for you. Read the directions carefully, and ask your doctor or other care provider to review them with you.